# Patient Record
(demographics unavailable — no encounter records)

---

## 2017-03-24 NOTE — RADRPT
PROCEDURE:   Chest 1 views. 

 

CLINICAL INDICATION:   Shortness of breath, possible sepsis. 

 

TECHNIQUE:   AP views of the chest was obtained. 

 

COMPARISON:   February 16, 2016 

 

FINDINGS:

The heart is large. Median sternotomy wires and surgical clips overlie the heart.  The lungs are hyp
erexpanded. No consolidations are identified.  No pneumothorax is seen. Minimal subsegmental atelect
asis is seen in the left mid lung.  The osseous structures are osteopenic, but appear grossly intact
.  Degenerative changes are identified in the shoulders. 

 

IMPRESSION:

Cardiomegaly .

 

Hyperexpanded lungs.

 

Subsegmental atelectasis in the left mid lung.

 

 

RPTAT: AA

_____________________________________________ 

.Aaron Hayes MD, MD           Date    Time 

Electronically viewed and signed by .Aaron Hayes MD, MD on 03/24/2017 15:41 

 

D:  03/24/2017 15:41  T:  03/24/2017 15:41

.P/

## 2017-03-24 NOTE — ERA
ER Documentation


Chief Complaint


Date/Time


DATE: 3/24/17 


TIME: 18:41


Chief Complaint


FEVER SOB PRODUCTIVE COUGH KIDNEY TRANSPORT 07/2016





HPI


Patient is a 51-year-old male with kidney transplant 8 months ago as well as 

hypertension and diabetes who presents with shortness of breath.  He has had 

shortness of breath over the past 4 days.  He has had a productive cough of 

yellow sputum.  He is felt short of breath as well.  His last creatinine was 

2.39 done about 1 week ago.  He went to an urgent care 3 days ago but has 

gotten worse.  He had his transplant done at [facility].





ROS


All systems reviewed and are negative except as per history of present illness.





Medications


Home Meds


Reported Medications


Pantoprazole* (Pantoprazole*) 40 Mg Tablet.dr, 40 MG PO AC BREAKFAST, TAB


   3/24/17


Hydrocodone/Acetaminophen (Norco 5-325 Tablet) Unknown Strength Tablet, 1 EACH 

PO Q6H Y for PRN, TAB


   3/24/17


Docusate Sodium* (Docusate Sodium*) 100 Mg Capsule, 100 MG PO BID Y for PRN, #

60 CAP


   3/24/17


Carvedilol* (Carvedilol*) 6.25 Mg Tablet, 6.25 MG PO BID, #60 TAB


   3/24/17


Fluconazole* (Fluconazole*) 200 Mg Tablet, 100 MG PO QAM, TAB


   3/24/17


Aspirin* (Aspirin* EC) 81 Mg Tablet.dr, 81 MG PO DAILY, TAB


   3/24/17


Sulfamethoxazole/Trimethoprim (Bactrim 400-80 mg Tablet) 1 Each Tablet, 1 EACH 

PO QAM, TAB


   TAKE FOR 12 MONTHS


   3/24/17


Belatacept (NULOJIX) 250 Mg Vial, 250 MG IV MONTHLY, VIAL


   3/24/17


Prednisone* (Prednisone*) 5 Mg Tab, 5 MG PO QAM, TAB


   3/24/17


Bumetanide* (Bumex*) 2 Mg Tab, 2 MG PO BID, TAB


   3/24/17


Tamsulosin Hcl* (Tamsulosin Hcl*) 0.4 Mg Cap.er.24h, 0.4 MG PO QPM, CAP


   3/24/17


Lisinopril* (Lisinopril*) 10 Mg Tablet, 10 MG PO DAILY, #30 TAB


   IF BP IS LESS THAN 110


   3/24/17


Lactulose* (Lactulose*) 10 Gm/15 Ml Solution, 30 GM PO BID Y for PRN, ML


   3/24/17


Valganciclovir HCl (Valganciclovir HCl) 450 Mg Tablet, 450 MG PO BID, TAB


   3/24/17


Insulin Glargine* (Lantus*) 100 Unit/Ml Soln, 20 UNIT SC QAM, #1 VIAL


   2/16/16


Insulin Lispro (Humalog) 100 U/Ml Cartridge, 15-25 UNITS SC WITH MEALS QID, EA


   2/16/16


Simvastatin* (Zocor*) 20 Mg Tablet, 20 MG PO QPM, #30 TAB


   2/16/16


Discontinued Reported Medications


Cholecalciferol* (Vitamin D3*) 2,000 Unit Cap, 2000 UNIT PO DAILY, CAP


   2/16/16


Cinacalcet* (Sensipar*) 30 Mg Tab, 30 MG PO DAILY, TAB


   2/16/16


Fish Oil* (Fish Oil*) 1,000 Mg Cap, 1000 MG PO TID, CAP


   2/16/16


Furosemide* (Furosemide*) 80 Mg Tablet, 80 MG PO BID, #60 TAB


   2/16/16


Gabapentin* (Gabapentin*) 300 Mg Capsule, 300 MG PO DAILY, #60 CAP


   2/16/16


Lisinopril* (Lisinopril*) 5 Mg Tablet, 5 MG PO DAILY, #30 TAB


   2/16/16


Nitroglycerin* (Nitrostat*) 0.4 Mg Tab.subl, 0.4 MG SL Q5MIN Y for CHEST PAIN, 

BOTTLE


   2/16/16


Omeprazole* (Omeprazole*) 20 Mg Capsule.dr, 20 MG PO DAILY, #30 CAP


   2/16/16


Carvedilol* (Carvedilol*) 25 Mg Tablet, 25 MG PO BID, #60 TAB


   2/16/16


Vitamin B Comp W-C* (Vitamin B W/C*) 1 Cap Capsule, 1 CAP PO DAILY, CAP


   2/16/16


Atorvastatin* (Atorvastatin*) 80 Mg Tablet, 80 MG PO QHS, #30 TAB


   2/16/16


Aspirin (Low Dose Aspirin) 81 Mg Tablet.dr, 81 MG PO DAILY, #30 TAB


   2/16/16


Amitriptyline Hcl* (Amitriptyline Hcl*) 10 Mg Tablet, 10 MG PO BID, #30 TAB


   2/16/16


Allopurinol* (Allopurinol*) 100 Mg Tablet, 100 MG PO DAILY, TAB


   2/16/16


Discontinued Scripts


Levofloxacin* (Levaquin*) 500 Mg Tablet, 500 MG PO Q48H for 6 Days, TAB


   Prov:ANEUDY GOMEZ MD         2/18/16





Allergies


Allergies:  


Coded Allergies:  


     No Known Allergy (Unverified , 3/24/17)





PMhx/Soc


History of Surgery:  Yes (LEFT AV FISTULA, CABG 2009, transplant recipient)


Anesthesia Reaction:  No


Hx Neurological Disorder:  No


Hx Respiratory Disorders:  Yes (COPD)


Hx Cardiac Disorders:  Yes (HTN, CAD, CABG)


Hx Psychiatric Problems:  No


Hx Miscellaneous Medical Probl:  Yes (HIGH CHOLESTEROL)


Hx Alcohol Use:  No


Hx Substance Use:  No


Hx Tobacco Use:  No


Smoking Status:  Never smoker





FmHx


Family History:  diabetes





Physical Exam


Vitals





Vital Signs








  Date Time  Temp Pulse Resp B/P Pulse Ox O2 Delivery O2 Flow Rate FiO2


 


3/24/17 16:24 99.0 68 19 108/64 100 Mask 6.0 


 


3/24/17 16:21      Simple Mask 5 


 


3/24/17 15:46  59 24  100 Simple Mask 10.0 


 


3/24/17 15:20 99.0 57 22 109/66 100 High Flow 10.0 





      Non Rebreather  


 


3/24/17 12:18 99.0 66 22 86/52 89   








Physical Exam


Const: Moderate distress secondary to shortness of breath


Head:   Atraumatic 


Eyes:    Normal Conjunctiva


ENT:    Normal External Ears, Nose and Mouth.


Neck:               Full range of motion..~ No meningismus.


Resp:   Decreased breath sounds bilaterally


Cardio:    Regular rate and rhythm, no murmurs


Abd:    Soft, non tender, non distended. Normal bowel sounds, no pain over his 

kidney transplant in the right abdomen


Skin:    No petechiae or rashes


Back:    No midline or flank tenderness


Ext:    No cyanosis, or edema


Neur:    Awake and alert


Psych:    Normal Mood and Affect


Result Diagram:  


3/24/17 1525                                                                   

             3/24/17 1525





Results 24 hrs





 Laboratory Tests








Test


  3/24/17


15:25 3/24/17


15:50


 


White Blood Count 3.710^3/ul  


 


Red Blood Count 4.4310^6/ul  


 


Hemoglobin 12.9g/dl  


 


Hematocrit 42.4%  


 


Mean Corpuscular Volume 95.7fl  


 


Mean Corpuscular Hemoglobin 29.1pg  


 


Mean Corpuscular Hemoglobin


Concent 30.4g/dl 


  


 


 


Red Cell Distribution Width 14.6%  


 


Platelet Count 41248^3/UL  


 


Mean Platelet Volume 11.3fl  


 


Neutrophils % 77.2%  


 


Lymphocytes % 17.7%  


 


Monocytes % 3.2%  


 


Eosinophils % 0.3%  


 


Basophils % 0.8%  


 


Nucleated Red Blood Cells % 0.0/100WBC  


 


Neutrophils # 2.910^3/ul  


 


Lymphocytes # 0.710^3/ul  


 


Monocytes # 0.110^3/ul  


 


Eosinophils # 0.010^3/ul  


 


Basophils # 0.010^3/ul  


 


Nucleated Red Blood Cells # 0.010^3/ul  


 


Prothrombin Time 15.9Sec  


 


Prothrombin Time Ratio 1.2  


 


INR International Normalized


Ratio 1.26 


  


 


 


Activated Partial


Thromboplast Time 35.1Sec 


  


 


 


Sodium Level 139mmol/L  


 


Potassium Level 5.9mmol/L  


 


Chloride Level 94mmol/L  


 


Carbon Dioxide Level 26mmol/L  


 


Anion Gap 25  


 


Blood Urea Nitrogen 78mg/dl  


 


Creatinine 3.33mg/dl  


 


Glucose Level 210mg/dl  


 


Lactic Acid Level 1.7mmol/L  


 


Calcium Level 10.0mg/dl  


 


Total Bilirubin 0.4mg/dl  


 


Direct Bilirubin 0.00mg/dl  


 


Indirect Bilirubin 0.4mg/dl  


 


Aspartate Amino Transf


(AST/SGOT) 20IU/L 


  


 


 


Alanine Aminotransferase


(ALT/SGPT) 25IU/L 


  


 


 


Alkaline Phosphatase 156IU/L  


 


Troponin I 0.106ng/ml  


 


Total Protein 7.7g/dl  


 


Albumin 4.7g/dl  


 


Globulin 3.00g/dl  


 


Albumin/Globulin Ratio 1.56  


 


Urine Color  DK. YELLOW 


 


Urine Clarity  CLOUDY 


 


Urine pH  5.0 


 


Urine Specific Gravity  >=1.030 


 


Urine Ketones  NEGATIVE 


 


Urine Nitrite  NEGATIVE 


 


Urine Bilirubin  1+ 


 


Urine Ictotest  NEGATIVE 


 


Urine Urobilinogen  1.0 E.U./dL 


 


Urine Leukocyte Esterase  NEGATIVE 


 


Urine Microscopic RBC  2-5/HPF 


 


Urine Microscopic WBC  5-10/HPF 


 


Urine Transitional Epithelial


Cells 


  MODERATE 


 


 


Urine Calcium Oxalate Crystals  FEW 


 


Urine Amorphous Urates  MODERATE 


 


Urine Bacteria  MODERATE 


 


Urine Hyaline Casts  MODERATE 


 


Urine Hemoglobin  TRACE 


 


Urine Glucose  NEGATIVE% 


 


Urine Total Protein  2+ 








 Current Medications








 Medications


  (Trade)  Dose


 Ordered  Sig/Huey


 Route


 PRN Reason  Start Time


 Stop Time Status Last Admin


Dose Admin


 


 Sodium Chloride


 3100 ml  3,100 ml  BOLUS OVER 2 HOURS STAT


 IV*


   3/24/17 14:48


 3/24/17 14:50 DC 3/24/17 15:41


 


 


 Cefepime HCl  50 ml @ 


 100 mls/hr  ONCE  STAT


 IVPB


   3/24/17 14:48


 3/24/17 15:17 DC 3/24/17 15:40


 


 


 Vancomycin HCl


  (Vancocin)  250 ml @ 


 125 mls/hr  ONCE  ONCE


 IVPB


   3/24/17 15:00


 3/24/17 16:59 DC 3/24/17 15:00


 


 


 Albuterol


  (Proventil


 0.083% (Neb))  5 mg  ONCE  STAT


 NEB


   3/24/17 14:48


 3/24/17 14:50 DC 3/24/17 15:45


 


 


 Ipratropium


 Bromide


  (Atrovent 0.02%


  (Neb))  0.5 mg  ONCE  STAT


 NEB


   3/24/17 14:48


 3/24/17 14:50 DC 3/24/17 15:44


 


 


 Sodium


 Polystyrene


 Sulfonate


  (Kayexalate)  30 gm  ONCE  STAT


 PO


   3/24/17 16:46


 3/24/17 16:47 DC 3/24/17 16:50


 











Procedures/MDM


EKG read by me: 


Rate/Rhythm: Regular rate and rhythm at a normal rate


Intervals:    Normal


Impression:     No evidence of ischemia or arrhythmia





Chest X-ray 1V Interpreted by me:


Soft Tissue:                                               No acute 

abnormalities


Bones:                                                    No acute abnormalities


Mediastinum/Cardiac Silhouette/Lungs:     Left lower lobe pneumonia





Admit MDM:


Patient's infectious symptoms have not stabilized and the patient is at risk of 

rapid decompensation.  The patient will be admitted for careful hydration, 

antibiotic therapy, and infectious source control.





Severe Sepsis criteria: 


Infectious source:   Pneumonia


End organ damage indicated by: 


Creatinine greater than 2





Sepsis Management:


Time of recognition of sepsis: [Upon arrival]





Within 3 hours of recognition:


Blood cultures x 2 before broad-spectrum antibiotics:   [Yes]


30 ml/kg NS bolus    [Completed]


Initial lactate      1.7


Repeat lactate     pending





Time of recognition of septic shock: [No septic shock]





Septic Shock Assessment:


Any lactic acid > 4.0   [No]


Persistent hypotension (SBP < 90 or 40 mmHg drop, MAP < 65) despite 30 mL/kg IV 

fluid bolus  [No]





Volume Re-assessment for Septic Shock (post 30 ml/kg bolus):


No septic shock at this time





Persistent Hypotension Treatment:


Comfort care   [No]


Central line   [Not Required]


Vasopressor started   [Not required]


I considered further perfusion assessment with CVP measurement, SCVO2, bedside 

ultrasound volume assessment, passive leg raise, trial of further fluid bolus.


And proceeded with [30 ml/kg fluid bolus of NSS, broad spectrum antibiotics, 

and admission.]





Accepting Care Team


Current data and ongoing care discussed.


Admitting Physician:   I spoke with the doctor at [facility] with the patient had his 

transplant who is accepted the patient.  I had initially spoken to the nurse 

practitioner he said that we could admit at our hospital but when I spoke with 

Dr. Chaidez she discussed with our nephrologist who feels that this is outside of 

his comfort level as he has a kidney transplant with worsening creatinine and 

therefore the patient will need transfer for a higher level of care.


Consultant(s):   [None]


Outstanding Data:   Culture results and repeat lactic acid





Critical Care:


Critical care time   45 minutes excluding all billable procedures


Emergent fluid management while maintaining close respiratory support.  

Provision of immediate and broad-spectrum antibiotic therapy.  Simultaneous 

assessment for possible sources in order to direct targeted therapy.  

Consideration for invasive and chemical support to prevent cardiopulmonary 

collapse.





Departure


Diagnosis:  


 Primary Impression:  


 Acute renal failure


 Qualified Code:  N17.9 - Acute renal failure, unspecified acute renal failure 

type


 Additional Impressions:  


 Shortness of breath


 Sepsis


 Qualified Code:  A41.9 - Sepsis, due to unspecified organism


 Pneumonia


 Qualified Code:  J18.9 - Pneumonia of left lower lobe due to infectious 

organism


 Severe sepsis


Condition:  Serious











KATARZYNA PLASCENCIA MD Mar 24, 2017 18:44